# Patient Record
Sex: MALE | Race: WHITE
[De-identification: names, ages, dates, MRNs, and addresses within clinical notes are randomized per-mention and may not be internally consistent; named-entity substitution may affect disease eponyms.]

---

## 2017-10-26 ENCOUNTER — HOSPITAL ENCOUNTER (OUTPATIENT)
Dept: HOSPITAL 19 - COL.LAB | Age: 67
End: 2017-10-26
Attending: ORTHOPAEDIC SURGERY
Payer: COMMERCIAL

## 2017-10-26 DIAGNOSIS — M17.11: ICD-10-CM

## 2017-10-26 DIAGNOSIS — Z01.812: Primary | ICD-10-CM

## 2021-04-13 ENCOUNTER — HOSPITAL ENCOUNTER (OUTPATIENT)
Dept: HOSPITAL 19 - COL.RAD | Age: 71
End: 2021-04-13
Payer: COMMERCIAL

## 2021-04-13 DIAGNOSIS — K80.20: Primary | ICD-10-CM

## 2021-04-13 DIAGNOSIS — Z90.79: ICD-10-CM

## 2021-04-13 DIAGNOSIS — C79.51: ICD-10-CM

## 2021-04-13 DIAGNOSIS — C61: ICD-10-CM

## 2021-04-13 DIAGNOSIS — Z96.89: ICD-10-CM

## 2021-04-13 PROCEDURE — A9503 TC99M MEDRONATE: HCPCS

## 2021-08-09 ENCOUNTER — HOSPITAL ENCOUNTER (OUTPATIENT)
Dept: HOSPITAL 19 - COL.RAD | Age: 71
End: 2021-08-09
Payer: COMMERCIAL

## 2021-08-09 DIAGNOSIS — Z90.89: ICD-10-CM

## 2021-08-09 DIAGNOSIS — C61: ICD-10-CM

## 2021-08-09 DIAGNOSIS — Z01.812: Primary | ICD-10-CM

## 2021-08-09 DIAGNOSIS — C79.51: ICD-10-CM

## 2021-08-09 PROCEDURE — A9503 TC99M MEDRONATE: HCPCS

## 2022-01-24 ENCOUNTER — HOSPITAL ENCOUNTER (OUTPATIENT)
Dept: HOSPITAL 19 - COL.RAD | Age: 72
End: 2022-01-24
Payer: COMMERCIAL

## 2022-01-24 DIAGNOSIS — C61: Primary | ICD-10-CM

## 2022-01-24 PROCEDURE — A9503 TC99M MEDRONATE: HCPCS

## 2022-04-26 ENCOUNTER — HOSPITAL ENCOUNTER (EMERGENCY)
Dept: HOSPITAL 19 - COL.ER | Age: 72
Discharge: HOME | End: 2022-04-26
Attending: EMERGENCY MEDICINE
Payer: COMMERCIAL

## 2022-04-26 VITALS — SYSTOLIC BLOOD PRESSURE: 131 MMHG | HEART RATE: 88 BPM | DIASTOLIC BLOOD PRESSURE: 72 MMHG

## 2022-04-26 VITALS — WEIGHT: 230.38 LBS | HEIGHT: 70.98 IN | BODY MASS INDEX: 32.25 KG/M2

## 2022-04-26 VITALS — TEMPERATURE: 98.1 F
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
## 2022-04-26 DIAGNOSIS — R07.81: Primary | ICD-10-CM
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
## 2022-05-09 ENCOUNTER — HOSPITAL ENCOUNTER (OUTPATIENT)
Dept: HOSPITAL 19 - COL.ER | Age: 72
Setting detail: OBSERVATION
LOS: 2 days | Discharge: HOME | End: 2022-05-11
Attending: FAMILY MEDICINE | Admitting: FAMILY MEDICINE
Payer: MEDICARE

## 2022-05-09 VITALS — BODY MASS INDEX: 30.86 KG/M2 | WEIGHT: 220.46 LBS | HEIGHT: 70.98 IN

## 2022-05-09 DIAGNOSIS — Z90.79: ICD-10-CM

## 2022-05-09 DIAGNOSIS — R94.31: ICD-10-CM

## 2022-05-09 DIAGNOSIS — E87.6: ICD-10-CM

## 2022-05-09 DIAGNOSIS — K82.A1: ICD-10-CM

## 2022-05-09 DIAGNOSIS — K21.9: ICD-10-CM

## 2022-05-09 DIAGNOSIS — I10: ICD-10-CM

## 2022-05-09 DIAGNOSIS — C79.9: ICD-10-CM

## 2022-05-09 DIAGNOSIS — Z79.899: ICD-10-CM

## 2022-05-09 DIAGNOSIS — K80.00: Primary | ICD-10-CM

## 2022-05-09 DIAGNOSIS — F41.9: ICD-10-CM

## 2022-05-09 DIAGNOSIS — S22.42XA: ICD-10-CM

## 2022-05-09 DIAGNOSIS — E87.1: ICD-10-CM

## 2022-05-09 DIAGNOSIS — C61: ICD-10-CM

## 2022-05-09 DIAGNOSIS — D72.829: ICD-10-CM

## 2022-05-09 LAB
ALBUMIN SERPL-MCNC: 4.3 GM/DL (ref 3.4–4.8)
ALP SERPL-CCNC: 86 U/L (ref 40–150)
ALT SERPL-CCNC: 12 U/L (ref 0–55)
ANION GAP SERPL CALC-SCNC: 18 MMOL/L (ref 7–16)
AST SERPL-CCNC: 24 U/L (ref 5–34)
BASOPHILS # BLD: 0 K/MM3 (ref 0–0.2)
BASOPHILS NFR BLD AUTO: 0.2 % (ref 0–2)
BILIRUB SERPL-MCNC: 0.6 MG/DL (ref 0.2–1.2)
BUN SERPL-MCNC: 9 MG/DL (ref 8–26)
CALCIUM SERPL-MCNC: 9.1 MG/DL (ref 8.4–10.2)
CHLORIDE SERPL-SCNC: 91 MMOL/L (ref 98–107)
CO2 SERPL-SCNC: 23 MMOL/L (ref 23–31)
CREAT SERPL-SCNC: 0.77 MG/DL (ref 0.72–1.25)
EOSINOPHIL # BLD: 0 K/MM3 (ref 0–0.7)
EOSINOPHIL NFR BLD: 0.1 % (ref 0–4)
ERYTHROCYTE [DISTWIDTH] IN BLOOD BY AUTOMATED COUNT: 11.1 % (ref 11.5–14.5)
GLUCOSE SERPL-MCNC: 128 MG/DL (ref 70–99)
GRANULOCYTES # BLD AUTO: 88.6 % (ref 42.2–75.2)
HCT VFR BLD AUTO: 37.1 % (ref 42–52)
HGB BLD-MCNC: 13.3 G/DL (ref 13.5–18)
KETONES UR STRIP.AUTO-MCNC: (no result) MG/DL
LIPASE SERPL-CCNC: 18 U/L (ref 8–78)
LYMPHOCYTES # BLD: 0.8 K/MM3 (ref 1.2–3.4)
LYMPHOCYTES NFR BLD: 4.9 % (ref 20–51)
MCH RBC QN AUTO: 32 PG (ref 27–31)
MCHC RBC AUTO-ENTMCNC: 36 G/DL (ref 33–37)
MCV RBC AUTO: 89 FL (ref 80–100)
MONOCYTES # BLD: 0.9 K/MM3 (ref 0.1–0.6)
MONOCYTES NFR BLD AUTO: 5.6 % (ref 1.7–9.3)
NEUTROPHILS # BLD: 14.3 K/MM3 (ref 1.4–6.5)
PH UR STRIP.AUTO: 7 [PH] (ref 5–8)
PLATELET # BLD AUTO: 290 K/MM3 (ref 130–400)
PMV BLD AUTO: 9 FL (ref 7.4–10.4)
POTASSIUM SERPL-SCNC: 3.6 MMOL/L (ref 3.5–4.5)
PROT SERPL-MCNC: 7.5 GM/DL (ref 6.2–8.1)
RBC # BLD AUTO: 4.16 M/MM3 (ref 4.2–5.6)
RBC # UR: (no result) /HPF (ref 0–2)
SODIUM SERPL-SCNC: 132 MMOL/L (ref 136–145)
SP GR UR STRIP.AUTO: 1.01 (ref 1–1.03)
TROPONIN I SERPL-MCNC: < 0.01 NG/ML (ref 0–0.03)
URN COLLECT METHOD CLASS: (no result)

## 2022-05-09 PROCEDURE — G0378 HOSPITAL OBSERVATION PER HR: HCPCS

## 2022-05-09 PROCEDURE — C9113 INJ PANTOPRAZOLE SODIUM, VIA: HCPCS

## 2022-05-10 VITALS — DIASTOLIC BLOOD PRESSURE: 59 MMHG | TEMPERATURE: 100.4 F | SYSTOLIC BLOOD PRESSURE: 133 MMHG | HEART RATE: 91 BPM

## 2022-05-10 VITALS — DIASTOLIC BLOOD PRESSURE: 78 MMHG | HEART RATE: 83 BPM | TEMPERATURE: 98.3 F | SYSTOLIC BLOOD PRESSURE: 113 MMHG

## 2022-05-10 VITALS — SYSTOLIC BLOOD PRESSURE: 122 MMHG | DIASTOLIC BLOOD PRESSURE: 64 MMHG | HEART RATE: 85 BPM | TEMPERATURE: 98.9 F

## 2022-05-10 VITALS — SYSTOLIC BLOOD PRESSURE: 120 MMHG | DIASTOLIC BLOOD PRESSURE: 63 MMHG | HEART RATE: 107 BPM

## 2022-05-10 LAB
ANION GAP SERPL CALC-SCNC: 12 MMOL/L (ref 7–16)
BUN SERPL-MCNC: 10 MG/DL (ref 8–26)
CALCIUM SERPL-MCNC: 8.5 MG/DL (ref 8.4–10.2)
CHLORIDE SERPL-SCNC: 94 MMOL/L (ref 98–107)
CO2 SERPL-SCNC: 26 MMOL/L (ref 23–31)
CREAT SERPL-SCNC: 0.76 MG/DL (ref 0.72–1.25)
ERYTHROCYTE [DISTWIDTH] IN BLOOD BY AUTOMATED COUNT: 11.3 % (ref 11.5–14.5)
GLUCOSE SERPL-MCNC: 139 MG/DL (ref 70–99)
HCT VFR BLD AUTO: 34.8 % (ref 42–52)
HGB BLD-MCNC: 12.1 G/DL (ref 13.5–18)
LYMPHOCYTES NFR BLD MANUAL: 5 % (ref 20–51)
MCH RBC QN AUTO: 32 PG (ref 27–31)
MCHC RBC AUTO-ENTMCNC: 35 G/DL (ref 33–37)
MCV RBC AUTO: 93 FL (ref 80–100)
MONOCYTES NFR BLD: 3 % (ref 1.7–9.3)
NEUTS BAND NFR BLD: 19 % (ref 0–10)
NEUTS SEG NFR BLD MANUAL: 73 % (ref 42–75.2)
PLATELET # BLD AUTO: 238 K/MM3 (ref 130–400)
PLATELET BLD QL SMEAR: NORMAL
PMV BLD AUTO: 8.7 FL (ref 7.4–10.4)
POTASSIUM SERPL-SCNC: 3.3 MMOL/L (ref 3.5–4.5)
RBC # BLD AUTO: 3.75 M/MM3 (ref 4.2–5.6)
SODIUM SERPL-SCNC: 132 MMOL/L (ref 136–145)
TROPONIN I SERPL-MCNC: 0.02 NG/ML (ref 0–0.03)

## 2022-05-10 NOTE — NUR
PATIENT TRANSFERED TO [facility] IN BED, 1000ML BAG OF NS HUNG ON STRAIGHT
TUBING. VITALS STABLE, NO COMPLAINTS OF PAIN.

## 2022-05-10 NOTE — NUR
PATIENT IN OR, ON FLOOR FOR 2HRS THEN TRANSFERED TO [facility]. NO COMPLAINTS OF
PAIN. NO SIGNIFICANT EVENTS DURING TME IN AUTHOR'S CARE. TEMP .4, NOTED
TO [facility] RN.

## 2022-05-10 NOTE — NUR
PATIENT RECEIVED AROUND 1955 STABLE POST OP SURGICAL INCISION TO ABD AREA
INTACT X5 ROUNDED BOWEL SOUNDS HYPOACTIVE. NO PAIN NOTED AT THIS TIME
TOLERATING CLEAR LIQUID DIET NO NAUSEA OR VOMITING NOTED. CONTINUING ON IV ABX
ZOSYN. REMAINS ON 4L/NC 02SAT 96% NO S/S OF SOB OR DISTRESS NOTED. CALL LIGHT
WITHIN REACH [facility] CONTINUE TO MONITOR.

## 2022-05-10 NOTE — NUR
PATIENT ARRIVED ON FLOOR IN BED, ACCOMPANIED WITH ER NURSE. NO COMPLAINTS OF
PAIN. LOW GRADE FEVER. RESTING COMFORTABLY. CONSENT SIGNED. IV ICGREEN GIVEN.
AWAITING TRANSPORT TO OR.

## 2022-05-11 VITALS — SYSTOLIC BLOOD PRESSURE: 127 MMHG | TEMPERATURE: 98.8 F | DIASTOLIC BLOOD PRESSURE: 57 MMHG | HEART RATE: 86 BPM

## 2022-05-11 VITALS — HEART RATE: 87 BPM | TEMPERATURE: 98.1 F | DIASTOLIC BLOOD PRESSURE: 62 MMHG | SYSTOLIC BLOOD PRESSURE: 134 MMHG

## 2022-05-11 VITALS — HEART RATE: 73 BPM | DIASTOLIC BLOOD PRESSURE: 65 MMHG | SYSTOLIC BLOOD PRESSURE: 121 MMHG

## 2022-05-11 VITALS — SYSTOLIC BLOOD PRESSURE: 141 MMHG | HEART RATE: 96 BPM | TEMPERATURE: 98.3 F | DIASTOLIC BLOOD PRESSURE: 66 MMHG

## 2022-05-11 LAB
ALBUMIN SERPL-MCNC: 2.7 GM/DL (ref 3.4–4.8)
ALP SERPL-CCNC: 50 U/L (ref 40–150)
ALT SERPL-CCNC: 44 U/L (ref 0–55)
ANION GAP SERPL CALC-SCNC: 10 MMOL/L (ref 7–16)
AST SERPL-CCNC: 53 U/L (ref 5–34)
BASOPHILS # BLD: 0 K/MM3 (ref 0–0.2)
BASOPHILS NFR BLD AUTO: 0.2 % (ref 0–2)
BILIRUB SERPL-MCNC: 0.8 MG/DL (ref 0.2–1.2)
BUN SERPL-MCNC: 14 MG/DL (ref 8–26)
CALCIUM SERPL-MCNC: 7.5 MG/DL (ref 8.4–10.2)
CHLORIDE SERPL-SCNC: 99 MMOL/L (ref 98–107)
CO2 SERPL-SCNC: 24 MMOL/L (ref 23–31)
CREAT SERPL-SCNC: 0.67 MG/DL (ref 0.72–1.25)
EOSINOPHIL # BLD: 0 K/MM3 (ref 0–0.7)
EOSINOPHIL NFR BLD: 0 % (ref 0–4)
ERYTHROCYTE [DISTWIDTH] IN BLOOD BY AUTOMATED COUNT: 11.5 % (ref 11.5–14.5)
GLUCOSE SERPL-MCNC: 148 MG/DL (ref 70–99)
GRANULOCYTES # BLD AUTO: 90.7 % (ref 42.2–75.2)
HCT VFR BLD AUTO: 28.7 % (ref 42–52)
HGB BLD-MCNC: 10.7 G/DL (ref 13.5–18)
LYMPHOCYTES # BLD: 0.6 K/MM3 (ref 1.2–3.4)
LYMPHOCYTES NFR BLD: 4.5 % (ref 20–51)
MCH RBC QN AUTO: 34 PG (ref 27–31)
MCHC RBC AUTO-ENTMCNC: 35 G/DL (ref 33–37)
MCV RBC AUTO: 92 FL (ref 80–100)
MONOCYTES # BLD: 0.5 K/MM3 (ref 0.1–0.6)
MONOCYTES NFR BLD AUTO: 3.6 % (ref 1.7–9.3)
NEUTROPHILS # BLD: 12.4 K/MM3 (ref 1.4–6.5)
PLATELET # BLD AUTO: 179 K/MM3 (ref 130–400)
PMV BLD AUTO: 9.2 FL (ref 7.4–10.4)
POTASSIUM SERPL-SCNC: 3.4 MMOL/L (ref 3.5–4.5)
PROT SERPL-MCNC: 5.5 GM/DL (ref 6.2–8.1)
RBC # BLD AUTO: 3.13 M/MM3 (ref 4.2–5.6)
SODIUM SERPL-SCNC: 133 MMOL/L (ref 136–145)

## 2022-05-11 NOTE — NUR
PATIENT WAS ABLE TO AMBULATE TO BR TOLERATING WELL BELCHING AND HAD BOWEL
MOVEMENT X2 LAST NIGHT. VERY PLEASE WITH HOW WELL HIS PAIN IS GONE AND FEELING
VERY COMFORTABLE. NO S/S PAIN NOTED PATIENT IS TOELRATING CLEAR LIQUIDS
THROUGHOUT SHIFT. SLEPT THROUGHOUT SHIFT COMFORTABLY WILL CONTINUE TO MONITOR.

## 2022-05-11 NOTE — NUR
HANNA met with the patient and his wife, Krystina (ph#[phone]), to discuss
discharge plan. The patient lives in [place] with his wife. He reports
independence with ADLs and does not have any DME. The patient's PCP is Dr. Jules Levine and he receives his medications from [facility]. The patient
does not have a DPOA-HC in EMR, but he states that he does have one completed
and that it designates his wife. The patient plans on returning home with his
wife upon discharge. No additional needs at this time.
 
*Discharge plan: home with wife*

## 2022-05-11 NOTE — NUR
The patient was downgraded to observation status. HANNA and RN Case Manager,
Daphnie, met with the patient to notify. HANNA presented the Medicare Outpatient
Observation Notice Form. The patient verbalized understanding and signed the
form. HANNA provided him with a copy.

## 2022-05-11 NOTE — NUR
Shift assessment completed. Patient refused morning vitamins, stating "I don't
need those, and I don't really want to be charged for them." Patient took
first dose of potassium replacement and stated, "I don't want to drink that
again today or tomorrow. I'm not taking anymore of that." Patient also
questioned why he was not receiving his BP medications and nifedipine. This RN
informed that patient that nifedipine was not on his med rec, however it does
show that the patient recently filled this medication. Medication added to med
rec. Patient also requested PRN xanax. Patient states he is very unhappy.
Patient A&Ox4, and a SBA. Gait is steady and patient only needs assistance w/
the IV pole. 5 lap sites on abdomen look good, no redness, swelling, or
drainage present. Patient states that his muscles are a little sore. Patient
informed that this is normal after surgery.

## 2022-05-11 NOTE — NUR
Received report that patient takes an oral chemotherapy agent daily for
metastatic prostate cancer. Verified with primary RN that patient takes Xtandi
(Enzalutamide). Chemo precautions for urine and stool are to be followed for 2
days after last administration. Notified primary nurse and sign placed
outside patient room.

## 2022-05-11 NOTE — NUR
IV D/C. DISCHARGE INSTRUCTIONS GIVEN, ALL QUESTIONS ANSWERED, WIFE AT BEDSIDE.
PT ESCORTED DOWN TO VEHICLE WITH BELONGINGS. WILL D/C FROM SYSTEM.